# Patient Record
Sex: MALE | Employment: OTHER | URBAN - METROPOLITAN AREA
[De-identification: names, ages, dates, MRNs, and addresses within clinical notes are randomized per-mention and may not be internally consistent; named-entity substitution may affect disease eponyms.]

---

## 2022-06-14 ENCOUNTER — TREATMENT (OUTPATIENT)
Dept: PHYSICAL THERAPY | Facility: CLINIC | Age: 67
End: 2022-06-14

## 2022-06-14 DIAGNOSIS — M25.562 CHRONIC PAIN OF LEFT KNEE: Primary | ICD-10-CM

## 2022-06-14 DIAGNOSIS — G89.29 CHRONIC PAIN OF LEFT KNEE: Primary | ICD-10-CM

## 2022-06-14 PROCEDURE — 97162 PT EVAL MOD COMPLEX 30 MIN: CPT | Performed by: PHYSICAL THERAPIST

## 2022-06-14 PROCEDURE — 97110 THERAPEUTIC EXERCISES: CPT | Performed by: PHYSICAL THERAPIST

## 2022-06-14 PROCEDURE — 97140 MANUAL THERAPY 1/> REGIONS: CPT | Performed by: PHYSICAL THERAPIST

## 2022-06-14 NOTE — PROGRESS NOTES
"Physical Therapy Initial Evaluation and Plan of Care    Patient: Puneet Cali   : 1955  Diagnosis/ICD-10 Code:  Chronic pain of left knee [M25.562, G89.29]  Referring practitioner: Antonio Ham MD  Date of Initial Visit: 2022  Today's Date: 2022  Patient seen for 1 sessions           Subjective Questionnaire: LEFS: 50% function       Subjective Evaluation    History of Present Illness  Mechanism of injury: Pt reports that he has been having pain in the L knee for ~4 months now. He fell off his stairs but didn't feel like he injured his knee then. He has noticed some numbness in the leg that's been getting worse over the last few months. Pain is right below the kneecap. Feels like the leg is \"tight\" and \"numb\". Walking too much makes the pain worse. He can't walk like he used to. Pt used to be able to walk 14 km even after his fall. Going up and down steps makes the pain worse, nida going down steps, however sometimes it's fine. [facility]. Also goes into the lap pool and swims around some. Those both help his knee feel better. He does wear a brace that helps some.He has been taking some prescription pain medication for the last week but not the last couple days. No h/o knee pain or surgery on the knee. No pain in the lower back. Did have x-ray and MRI which showed some arthritis in the knee but no other significant findings. He did have injection in the L knee ~3-4 weeks ago but didn't help any. MD follow-up as needed.     Quality of life: good    Pain  Current pain ratin  At best pain ratin  At worst pain ratin  Quality: dull ache  Relieving factors: medications and rest ([facility])  Aggravating factors: ambulation, stairs and standing    Social Support  Lives in: multiple-level home    Diagnostic Tests  X-ray: normal    Patient Goals  Patient goals for therapy: decreased pain, increased motion and return to " sport/leisure activities             Objective          Observations   Left Knee   Negative for deformity and edema.       Tenderness   Left Knee   Tenderness in the inferior patella, medial patella, medial retinaculum, patellar tendon and tibial tubercle.     Neurological Testing     Sensation     Knee   Left Knee   Intact: light touch    Right Knee   Intact: light touch     Active Range of Motion   Left Hip   Normal active range of motion    Right Hip   Normal active range of motion  Left Knee   Flexion: 140 degrees   Extension: 0 degrees     Right Knee   Flexion: 145 degrees   Extension: 0 degrees     Passive Range of Motion     Additional Passive Range of Motion Details  Min limitation in ER of the tibia     Patellar Mobility   Left Knee Patellar tendons within functional limits include the lateral and inferior. Hypomobile in the left medial and left superior patellar tendon(s).     Right Knee Patellar tendons within functional limits include the medial, lateral, superior and inferior.     Patellar Static Positioning   Left Knee: WFL  Right Knee: WFL    Strength/Myotome Testing     Left Hip   Planes of Motion   Flexion: 4  Extension: 3+  Abduction: 4-    Right Hip   Planes of Motion   Flexion: 4+  Extension: 4-  Abduction: 4    Left Knee   Flexion: 4+  Extension: 4  Quadriceps contraction: fair    Right Knee   Normal strength    Tests     Left Knee   Negative active quad, anterior drawer, patellar apprehension, patellar compression, posterior drawer, valgus stress test at 0 degrees and varus stress test at 0 degrees.           Assessment & Plan     Assessment  Impairments: abnormal gait, abnormal or restricted ROM, activity intolerance, impaired physical strength, lacks appropriate home exercise program and pain with function  Functional Limitations: lifting, walking, uncomfortable because of pain and standing  Assessment details: Pt is a 66 year old male with c/o L knee pain. Pt demonstrates signs and symptoms  consistent with possible patellofemoral pain. Pt displays poor glut strength/activation as well as tightness in the quad and hip flexors which could be contributing to pain. Min limitation noted in joint play and with ROM of the knee compared to the R knee. Pt is limited with ambulation, stairs, squatting and other functional tasks due to knee pain. Pt would benefit from skilled PT in order to address impairments and improve overall function.     Prognosis: good    Goals  Plan Goals: STG (3 weeks):  Pt will demonstrate increased activation of quad and gluts during exercises to assist with decreased load on the knee.   Pt will display improved ROM of L knee to equal the R with min to no pain to assist with functional tasks.     LTG (6 weeks):  Pt will be independent with home exercises to assist with improved strength and continue to improve function.   Pt will demonstrate increased score on the LEFS to 70% to demonstrate decreased overall impairment.   Pt will be able to amb for longer distances with min to no pain in the knee.   Pt will demonstrate improved hip strength to 4+/5 overall to assist with function.    Pt will display improved flexibility of the quad and hip flexors to WFL to improve knee mechanics.       Plan  Therapy options: will be seen for skilled therapy services  Planned modality interventions: cryotherapy, TENS and thermotherapy (hydrocollator packs)  Planned therapy interventions: ADL retraining, balance/weight-bearing training, body mechanics training, flexibility, functional ROM exercises, gait training, home exercise program, joint mobilization, manual therapy, motor coordination training, neuromuscular re-education, soft tissue mobilization, strengthening, stretching and therapeutic activities  Frequency: 2x week  Duration in weeks: 4  Treatment plan discussed with: patient           History # of Personal Factors and/or Comorbidities: MODERATE (1-2)  Examination of Body System(s): # of  elements: MODERATE (3)  Clinical Presentation: EVOLVING  Clinical Decision Making: MODERATE      Eval:  Low Eval          Mins  29532  Mod Eval     25     Mins  75340  High Eval                            Mins  98544    Timed:         Manual Therapy:    10     mins  76255;     Therapeutic Exercise:    20     mins  14285;     Neuromuscular Temo:        mins  98701;    Therapeutic Activity:          mins  27549;     Gait Training:           mins  94746;       Un-Timed:  Electrical Stimulation:         mins  36147 (MC );  Traction          mins 29035      Timed Treatment:   30   mins   Total Treatment:     55   mins    PT SIGNATURE: Denisse Jade PT, DPT, OCS           [place] License # [id number]           [place] License # [id number]    DATE TREATMENT INITIATED: 6/14/2022    Initial Certification  Certification Period: 9/12/2022  I certify that the therapy services are furnished while this patient is under my care.  The services outlined above are required by this patient, and will be reviewed every 90 days.     PHYSICIAN: Antonio Ham MD      DATE:     Please sign and return via fax to [phone].. Thank you, [facility].

## 2023-01-27 ENCOUNTER — TRANSCRIBE ORDERS (OUTPATIENT)
Dept: PHYSICAL THERAPY | Facility: CLINIC | Age: 68
End: 2023-01-27
Payer: MEDICARE

## 2023-01-27 DIAGNOSIS — M43.16 SPONDYLOLISTHESIS OF LUMBAR REGION: ICD-10-CM

## 2023-01-27 DIAGNOSIS — M54.31 BILATERAL SCIATICA: Primary | ICD-10-CM

## 2023-01-27 DIAGNOSIS — M54.32 BILATERAL SCIATICA: Primary | ICD-10-CM

## 2023-01-30 ENCOUNTER — TREATMENT (OUTPATIENT)
Dept: PHYSICAL THERAPY | Facility: CLINIC | Age: 68
End: 2023-01-30
Payer: MEDICARE

## 2023-01-30 DIAGNOSIS — M43.16 SPONDYLOLISTHESIS OF LUMBAR REGION: Primary | ICD-10-CM

## 2023-01-30 DIAGNOSIS — G89.29 CHRONIC PAIN OF LEFT KNEE: ICD-10-CM

## 2023-01-30 DIAGNOSIS — M25.562 CHRONIC PAIN OF LEFT KNEE: ICD-10-CM

## 2023-01-30 DIAGNOSIS — M54.32 BILATERAL SCIATICA: ICD-10-CM

## 2023-01-30 DIAGNOSIS — M54.31 BILATERAL SCIATICA: ICD-10-CM

## 2023-01-30 PROCEDURE — 97110 THERAPEUTIC EXERCISES: CPT | Performed by: PHYSICAL THERAPIST

## 2023-01-30 PROCEDURE — 97161 PT EVAL LOW COMPLEX 20 MIN: CPT | Performed by: PHYSICAL THERAPIST

## 2023-02-06 ENCOUNTER — TREATMENT (OUTPATIENT)
Dept: PHYSICAL THERAPY | Facility: CLINIC | Age: 68
End: 2023-02-06
Payer: MEDICARE

## 2023-02-06 DIAGNOSIS — M43.16 SPONDYLOLISTHESIS OF LUMBAR REGION: Primary | ICD-10-CM

## 2023-02-06 DIAGNOSIS — M54.31 BILATERAL SCIATICA: ICD-10-CM

## 2023-02-06 DIAGNOSIS — G89.29 CHRONIC PAIN OF LEFT KNEE: ICD-10-CM

## 2023-02-06 DIAGNOSIS — M54.32 BILATERAL SCIATICA: ICD-10-CM

## 2023-02-06 DIAGNOSIS — M25.562 CHRONIC PAIN OF LEFT KNEE: ICD-10-CM

## 2023-02-06 PROCEDURE — 97110 THERAPEUTIC EXERCISES: CPT | Performed by: PHYSICAL THERAPIST

## 2023-02-06 PROCEDURE — 97530 THERAPEUTIC ACTIVITIES: CPT | Performed by: PHYSICAL THERAPIST

## 2023-02-06 PROCEDURE — 97112 NEUROMUSCULAR REEDUCATION: CPT | Performed by: PHYSICAL THERAPIST

## 2023-02-06 NOTE — PROGRESS NOTES
Physical Therapy Daily Treatment Note    [address] [place] [address]    VISIT#: 2    Subjective   Puneet Cali reports that he is doing much better and has gotten a lot of relief with lumbar extension. Pt reports that he has no pain but has noted slight swelling in the posterior aspect of his L knee.   Pain Rating (0/10): 0    Objective     See Exercise, Manual, and Modality Logs for complete treatment.     Patient Education: Progress of therapy and POC    Assessment/Plan  Pt started on the Nustep today and then progressed to lumbar ext exercises due to pt reporting noted pain relief with ext. Pt then performed higher level functional core strengthening exercises with BLE involvement with core activation emphasized    Plan  Progress per Plan of Care and Progress strengthening /stabilization /functional activity            Timed:         Manual Therapy:         mins  34050;     Therapeutic Exercise:    10     mins  57998;     Neuromuscular Temo:    15    mins  29286;    Therapeutic Activity:     15     mins  89605;     Gait Training:           mins  73538;     Ultrasound:          mins  54772;    Ionto                                   mins   06358  Self Care                            mins   44150    Un-Timed:  Electrical Stimulation:         mins  68915 ( );  Dry Needling          mins self-pay  Traction          mins 65071  Low Eval          Mins  68022  Mod Eval          Mins  04703  High Eval                            Mins  07298  Re-Eval                               mins  42419    Timed Treatment:   40   mins   Total Treatment:     40   mins    Susan Cope PT, DPT  Physical Therapist

## 2023-02-13 ENCOUNTER — TREATMENT (OUTPATIENT)
Dept: PHYSICAL THERAPY | Facility: CLINIC | Age: 68
End: 2023-02-13
Payer: MEDICARE

## 2023-02-13 DIAGNOSIS — M54.32 BILATERAL SCIATICA: ICD-10-CM

## 2023-02-13 DIAGNOSIS — M54.31 BILATERAL SCIATICA: ICD-10-CM

## 2023-02-13 DIAGNOSIS — G89.29 CHRONIC PAIN OF LEFT KNEE: ICD-10-CM

## 2023-02-13 DIAGNOSIS — M25.562 CHRONIC PAIN OF LEFT KNEE: ICD-10-CM

## 2023-02-13 DIAGNOSIS — M43.16 SPONDYLOLISTHESIS OF LUMBAR REGION: Primary | ICD-10-CM

## 2023-02-13 PROCEDURE — 97112 NEUROMUSCULAR REEDUCATION: CPT | Performed by: PHYSICAL THERAPIST

## 2023-02-13 PROCEDURE — 97110 THERAPEUTIC EXERCISES: CPT | Performed by: PHYSICAL THERAPIST

## 2023-02-13 PROCEDURE — 97530 THERAPEUTIC ACTIVITIES: CPT | Performed by: PHYSICAL THERAPIST

## 2023-02-13 NOTE — PROGRESS NOTES
Physical Therapy Daily Treatment Note    [address] [place] [address]    VISIT#: 3    Subjective   Puneet Cali reports that he is getting better each day but is still limited in ambulation tolerance due to L knee pain.   Pain Rating (0/10): 1    Objective     See Exercise, Manual, and Modality Logs for complete treatment.     Patient Education: Progress of therapy and POC    Assessment/Plan  Pt continues to progress functional core strengthening and BLE strengthening with minimal to no increase in pain throughout the session.     Plan  Progress per Plan of Care and Progress strengthening /stabilization /functional activity            Timed:         Manual Therapy:         mins  05859;     Therapeutic Exercise:    15     mins  71135;     Neuromuscular Temo:    8    mins  14144;    Therapeutic Activity:     15     mins  49262;     Gait Training:           mins  41330;     Ultrasound:          mins  93730;    Ionto                                   mins   29112  Self Care                            mins   81347    Un-Timed:  Electrical Stimulation:         mins  46750 ( );  Dry Needling          mins self-pay  Traction          mins 14090  Low Eval          Mins  45516  Mod Eval          Mins  15860  High Eval                            Mins  01382  Re-Eval                               mins  14127    Timed Treatment:   38   mins   Total Treatment:     38   mins    Susan Cope PT, DPT  Physical Therapist

## 2023-02-20 ENCOUNTER — TELEPHONE (OUTPATIENT)
Dept: PHYSICAL THERAPY | Facility: CLINIC | Age: 68
End: 2023-02-20

## 2025-02-21 ENCOUNTER — DOCUMENTATION (OUTPATIENT)
Dept: PHYSICAL THERAPY | Facility: CLINIC | Age: 70
End: 2025-02-21
Payer: MEDICARE

## 2025-02-21 NOTE — PROGRESS NOTES
Discharge Summary  Discharge Summary from Physical Therapy Report      Date of Initial PT visit: 6/14/2022  Number of Visits Seen: 1     Discharge Status of Patient:   Pt did not return after initial evaluation. Will be discharged at this time.     Goals: Not Met    Discharge Plan: Continue with current home exercise program as instructed      Date of Discharge: 2/21/2025      Denisse Jade, PT, DPT, OCS     [place] License # [id number]

## 2025-07-18 ENCOUNTER — DOCUMENTATION (OUTPATIENT)
Dept: PHYSICAL THERAPY | Facility: CLINIC | Age: 70
End: 2025-07-18
Payer: MEDICARE

## 2025-07-18 NOTE — PROGRESS NOTES
Discharge Summary  Discharge Summary from Physical Therapy Report      Date of Initial PT visit: 1/30/23  Number of Visits Seen: 3     Discharge Status of Patient: See MD Note dated 2/13/23    Goals: Partially Met    Discharge Plan: Continue with current home exercise program as instructed  Future need for rehabilitation activities        Date of Discharge 7/18/25        Susan Cope, PT, DPT  Physical Therapist